# Patient Record
Sex: FEMALE | Race: WHITE | HISPANIC OR LATINO
[De-identification: names, ages, dates, MRNs, and addresses within clinical notes are randomized per-mention and may not be internally consistent; named-entity substitution may affect disease eponyms.]

---

## 2017-10-17 ENCOUNTER — RESULT REVIEW (OUTPATIENT)
Age: 62
End: 2017-10-17

## 2017-11-06 ENCOUNTER — RESULT REVIEW (OUTPATIENT)
Age: 62
End: 2017-11-06

## 2019-08-04 ENCOUNTER — EMERGENCY (EMERGENCY)
Facility: HOSPITAL | Age: 64
LOS: 1 days | Discharge: HOME | End: 2019-08-04
Attending: EMERGENCY MEDICINE
Payer: COMMERCIAL

## 2019-08-04 VITALS
TEMPERATURE: 98 F | OXYGEN SATURATION: 97 % | HEIGHT: 63 IN | RESPIRATION RATE: 18 BRPM | DIASTOLIC BLOOD PRESSURE: 74 MMHG | SYSTOLIC BLOOD PRESSURE: 114 MMHG | HEART RATE: 76 BPM | WEIGHT: 143.96 LBS

## 2019-08-04 VITALS
SYSTOLIC BLOOD PRESSURE: 103 MMHG | OXYGEN SATURATION: 97 % | DIASTOLIC BLOOD PRESSURE: 69 MMHG | RESPIRATION RATE: 20 BRPM | TEMPERATURE: 98 F | HEART RATE: 64 BPM

## 2019-08-04 PROCEDURE — 99283 EMERGENCY DEPT VISIT LOW MDM: CPT

## 2019-08-04 RX ORDER — OFLOXACIN 0.3 %
1 DROPS OPHTHALMIC (EYE) ONCE
Refills: 0 | Status: COMPLETED | OUTPATIENT
Start: 2019-08-04 | End: 2019-08-04

## 2019-08-04 RX ADMIN — Medication 1 DROP(S): at 16:18

## 2019-08-04 NOTE — ED ADULT NURSE NOTE - NSIMPLEMENTINTERV_GEN_ALL_ED
Implemented All Universal Safety Interventions:  [place] to call system. Call bell, personal items and telephone within reach. Instruct patient to call for assistance. Room bathroom lighting operational. Non-slip footwear when patient is off stretcher. Physically safe environment: no spills, clutter or unnecessary equipment. Stretcher in lowest position, wheels locked, appropriate side rails in place.

## 2019-08-04 NOTE — ED ADULT NURSE NOTE - OBJECTIVE STATEMENT
62 yo F presents to ED A+OX3, ambulatory with steady gait from waiting room c/o L eye redness since this morning. Pt. is well-appearing, sitting up in chair in no apparent distress. Breathing unlabored on RA. Skin warm pink and dry. Mild redness noted to L eye, no active drainage noted. Comfort and safety measures in place.

## 2019-08-04 NOTE — ED PROVIDER NOTE - NSFOLLOWUPCLINICS_GEN_ALL_ED_FT
[facility]  Ophthalmology  [address] [place] [address] [address]  [place] [address]  Phone: [phone]  Fax:   Follow Up Time:

## 2019-08-04 NOTE — ED PROVIDER NOTE - NSFOLLOWUPINSTRUCTIONS_ED_ALL_ED_FT
Do not rub your eye.  Take Tylenol 500mg or 650mg every 6hours for pain as needed.  Ofloxacin eye drop, 1 drop, to left eye every 6hours.  Follow up with your eye Dr. or clinic. [phone], call tomorrow for an appointment in 2days.  Return for any concerns or worsening symptoms.

## 2019-08-04 NOTE — ED PROVIDER NOTE - OBJECTIVE STATEMENT
Patient noted L eye irritation and redness this morning with some discharge from eye.  No eye pain.  Noted eyelid swelling which improved with warm compress.  Denying visual changes.

## 2019-08-04 NOTE — ED PROVIDER NOTE - PHYSICAL EXAMINATION
NAD, VSS, Afebrile, + PERRL with full EOMs. + Left injected conjunctiva with clear conjunctiva. Neuro- intact.

## 2019-11-20 ENCOUNTER — RESULT REVIEW (OUTPATIENT)
Age: 64
End: 2019-11-20

## 2020-04-25 ENCOUNTER — MESSAGE (OUTPATIENT)
Age: 65
End: 2020-04-25

## 2020-05-07 LAB
SARS-COV-2 IGG SERPL IA-ACNC: 149 INDEX
SARS-COV-2 IGG SERPL QL IA: POSITIVE

## 2020-08-19 NOTE — ED ADULT NURSE NOTE - NSFALLRSKASSESASSIST_ED_ALL_ED
[FreeTextEntry1] : The images were reviewed and discussed with the patient.  It was recommended that the MRI be repeated in February which would be one year from when it was found.  It was also discussed that it would be reasonable to have surgery to have it removed.  The patient may make an appointment to come to the office to discuss surgery for removal of the pituitary adenoma. no

## 2022-09-14 ENCOUNTER — APPOINTMENT (OUTPATIENT)
Dept: OBGYN | Facility: CLINIC | Age: 67
End: 2022-09-14

## 2022-09-14 VITALS
SYSTOLIC BLOOD PRESSURE: 116 MMHG | DIASTOLIC BLOOD PRESSURE: 76 MMHG | HEIGHT: 63 IN | WEIGHT: 158 LBS | BODY MASS INDEX: 28 KG/M2

## 2022-09-14 DIAGNOSIS — Z01.411 ENCOUNTER FOR GYNECOLOGICAL EXAMINATION (GENERAL) (ROUTINE) WITH ABNORMAL FINDINGS: ICD-10-CM

## 2022-09-14 PROCEDURE — 99386 PREV VISIT NEW AGE 40-64: CPT

## 2022-09-14 PROCEDURE — 82270 OCCULT BLOOD FECES: CPT

## 2022-09-16 NOTE — PLAN
[FreeTextEntry1] : Routine Gyn Exam:\par BSE taught\par Pap smear conducted**\par Rx given for mammogram and breast sonogram**\par Advised pt. to schedule colonoscopy**\par \par Rectal prolapse vs internal hemorrhoid - send to dr dring asap\par \par RTO in 1 year or PRN\par

## 2022-09-16 NOTE — HISTORY OF PRESENT ILLNESS
[FreeTextEntry1] : 2022. ALVINO BAUM 66 year old female  LMP PM, PMH . RETURN PT FROM 2019.\par \par She presents for annual gyn exam and offers no complaints. Denies abnormal vaginal bleeding, abnormal vaginal discharge and vaginitis symptoms. No abdominal or pelvic pain. Denies dysuria, incontinence of urine or hematuria. She reports normal BMs. Denies bloody stool or constipation/diarrhea.\par \par Reports new sx for the last year of mucous discharge from the rectum, no bleeding. Pt has a known h.o hemorrhoids but this feels different. Denies pain. reports it "comes in and out"\par \par believes she had rectal prolapse as a child during an episode of dysentery. \par

## 2022-09-16 NOTE — PHYSICAL EXAM
[Appropriately responsive] : appropriately responsive [Alert] : alert [No Acute Distress] : no acute distress [No Lymphadenopathy] : no lymphadenopathy [Regular Rate Rhythm] : regular rate rhythm [No Murmurs] : no murmurs [Clear to Auscultation B/L] : clear to auscultation bilaterally [Soft] : soft [Non-tender] : non-tender [Non-distended] : non-distended [No HSM] : No HSM [No Lesions] : no lesions [No Mass] : no mass [Oriented x3] : oriented x3 [Examination Of The Breasts] : a normal appearance [No Masses] : no breast masses were palpable [Labia Majora] : normal [Labia Minora] : normal [Normal] : normal [Uterine Adnexae] : normal [FreeTextEntry9] : ?prolapse of rectum, internal hemorrhoid

## 2022-09-19 NOTE — ED PROVIDER NOTE - EYES [+], MLM
September 20, 2022      Carmelo Leon  [address]  [place] [address]        To Whom It May Concern:    Carmelo Leon was seen in our clinic. 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        Sincerely,      Dante Ramírez MD for MD CEE Canas/stepan           REDNESS/DISCHARGE

## 2022-09-29 ENCOUNTER — APPOINTMENT (OUTPATIENT)
Dept: SURGERY | Facility: CLINIC | Age: 67
End: 2022-09-29

## 2022-09-29 VITALS
OXYGEN SATURATION: 99 % | RESPIRATION RATE: 17 BRPM | WEIGHT: 158 LBS | TEMPERATURE: 97.3 F | HEART RATE: 66 BPM | BODY MASS INDEX: 28 KG/M2 | DIASTOLIC BLOOD PRESSURE: 78 MMHG | HEIGHT: 63 IN | SYSTOLIC BLOOD PRESSURE: 123 MMHG

## 2022-09-29 DIAGNOSIS — Z78.9 OTHER SPECIFIED HEALTH STATUS: ICD-10-CM

## 2022-09-29 DIAGNOSIS — K62.3 RECTAL PROLAPSE: ICD-10-CM

## 2022-09-29 DIAGNOSIS — Z82.49 FAMILY HISTORY OF ISCHEMIC HEART DISEASE AND OTHER DISEASES OF THE CIRCULATORY SYSTEM: ICD-10-CM

## 2022-09-29 DIAGNOSIS — K64.2 THIRD DEGREE HEMORRHOIDS: ICD-10-CM

## 2022-09-29 DIAGNOSIS — Z12.11 ENCOUNTER FOR SCREENING FOR MALIGNANT NEOPLASM OF COLON: ICD-10-CM

## 2022-09-29 PROCEDURE — 46221 LIGATION OF HEMORRHOID(S): CPT

## 2022-09-29 PROCEDURE — 99204 OFFICE O/P NEW MOD 45 MIN: CPT | Mod: 25

## 2022-09-29 NOTE — ASSESSMENT
[FreeTextEntry1] : I have seen and evaluated patient and I have corroborated all nursing input into this note.  Patient has a significant birthing injury with loss of perineal body and near traumatic cloaca.  However, the patient reports normal continence of gas and stool.  The patient inquired about surgical repair.  I recommended against repair because any further surgical intervention in that area could result in incontinence.  If patient develops incontinence in the future then an overlapping sphincteroplasty with perineal reconstruction could be performed.  Currently, the patient has symptomatic prolapse of the right anterior hemorrhoid and associated rectal mucosa.  I recommended rubber band ligation.  Indications, risks, benefits, alternatives reviewed including but not limited to bleeding, infection, and failure.  Patient agreed.  The tissue was banded and the post band instruction sheet was reviewed.  Finally, it has been over 10 years since the patient's last colonoscopy and I recommended a follow-up exam.  Indications, risks, benefits, alternatives reviewed including but not limited to bleeding, perforation, and incomplete exam.  All questions were answered.  The patient plans to schedule a exam in the near future.

## 2022-09-29 NOTE — PHYSICAL EXAM
[Normal Breath Sounds] : Normal breath sounds [Normal Heart Sounds] : normal heart sounds [No Rash or Lesion] : No rash or lesion [Alert] : alert [Oriented to Person] : oriented to person [Oriented to Place] : oriented to place [Oriented to Time] : oriented to time [Calm] : calm [JVD] : no jugular venous distention  [de-identified] : WNL [de-identified] : WNL [de-identified] : ROM WNL [FreeTextEntry1] : Perianal inspection demonstrated loss of perineum from birthing injury.  Right anterior hemorrhoid and mucosal prolapse noted.  The prolapsing tissue was reducible on digital exam.  Anoscopy confirmed an enlarged right anterior hemorrhoid and inflamed right anterior mucosa.

## 2022-09-29 NOTE — REASON FOR VISIT
HPI: 67 yo female accompanied by daughter who serves as  in pt with severe MR and EF 40% in past.  Pt has previously refused surgical or percutaneous repair and does not complain of fatigue, or SOB but does have LOMELI.  Denies bipedal edema, no fevers, chills, NS.  Reports complaince of meds.      PAST MEDICAL HISTORY:  Past Medical History:   Diagnosis Date     Asthma      Cardiomyopathy, unspecified (H) / EF40% 10/5/2015     CHF (congestive heart failure) (H)      Hyperlipidemia      Kidney stone 10/2013     Lipoma 2000     Menopause      Mitral valve insufficiency 10/5/2015     Vitamin D deficiency        FAMILY HISTORY:  No family history on file.    SOCIAL HISTORY:  Social History     Socioeconomic History     Marital status:      Spouse name: None     Number of children: None     Years of education: None     Highest education level: None   Occupational History     None   Social Needs     Financial resource strain: None     Food insecurity     Worry: None     Inability: None     Transportation needs     Medical: None     Non-medical: None   Tobacco Use     Smoking status: Never Smoker     Smokeless tobacco: Never Used   Substance and Sexual Activity     Alcohol use: No     Drug use: No     Sexual activity: Yes     Partners: Male     Birth control/protection: None   Lifestyle     Physical activity     Days per week: None     Minutes per session: None     Stress: None   Relationships     Social connections     Talks on phone: None     Gets together: None     Attends Bahai service: None     Active member of club or organization: None     Attends meetings of clubs or organizations: None     Relationship status: None     Intimate partner violence     Fear of current or ex partner: None     Emotionally abused: None     Physically abused: None     Forced sexual activity: None   Other Topics Concern     Parent/sibling w/ CABG, MI or angioplasty before 65F 55M? No      Service Not Asked      "Blood Transfusions Not Asked     Caffeine Concern Yes     Occupational Exposure Not Asked     Hobby Hazards Not Asked     Sleep Concern Not Asked     Stress Concern Not Asked     Weight Concern Not Asked     Special Diet Not Asked     Back Care Not Asked     Exercise Yes     Comment: walking     Bike Helmet Not Asked     Seat Belt Not Asked     Self-Exams Not Asked   Social History Narrative     None       CURRENT MEDICATIONS:  Current Outpatient Medications   Medication Sig Dispense Refill     aspirin 81 MG tablet Take 1 tablet (81 mg) by mouth daily 100 tablet 3     Cyanocobalamin (VITAMIN B 12 PO) Take 1,000 mcg by mouth daily       furosemide (LASIX) 20 MG tablet Take 1 tablet (20 mg) by mouth daily 90 tablet 3     losartan (COZAAR) 50 MG tablet TAKE 1 TABLET BY MOUTH EVERY DAY 90 tablet 1     metoprolol succinate ER (TOPROL-XL) 50 MG 24 hr tablet Take 1 tablet (50 mg) by mouth At Bedtime 90 tablet 0     UNABLE TO FIND daily MEDICATION NAME: Vitamin D with something for joints         ROS:   Constitutional: No fever, chills, or sweats. No weight gain/loss.   ENT: No visual disturbance, ear ache, epistaxis, sore throat.   Allergies/Immunologic: Negative.   Respiratory: No cough, hemoptysis.   Cardiovascular: As per HPI.   GI: No nausea, vomiting, hematemesis, melena, or hematochezia.   : No urinary frequency, dysuria, or hematuria.   Integument: Negative.   Psychiatric: Negative.   Neuro: Negative.   Endocrinology: Negative.   Musculoskeletal: Negative.    EXAM:  BP (!) 151/84 (BP Location: Right arm, Patient Position: Sitting, Cuff Size: Adult Regular)   Pulse 59   Ht 1.575 m (5' 2\")   Wt 72.1 kg (159 lb)   LMP  (LMP Unknown)   SpO2 99%   BMI 29.08 kg/m    General: appears comfortable, alert and articulate  Head: normocephalic, atraumatic  Eyes: anicteric sclera, EOMI  No pedal edema    Labs:  CBC RESULTS:  Lab Results   Component Value Date    WBC 6.1 02/11/2019    RBC 4.15 02/11/2019    HGB 12.0 " 02/11/2019    HCT 37.0 02/11/2019    MCV 89 02/11/2019    MCH 28.9 02/11/2019    MCHC 32.4 02/11/2019    RDW 13.0 02/11/2019     02/11/2019       CMP RESULTS:  Lab Results   Component Value Date     06/09/2020    POTASSIUM 4.2 06/09/2020    CHLORIDE 109 06/09/2020    CO2 25 06/09/2020    ANIONGAP 6 06/09/2020    GLC 83 06/09/2020    BUN 16 06/09/2020    CR 0.72 06/09/2020    GFRESTIMATED 89 06/09/2020    GFRESTBLACK >90 06/09/2020    MIKE 8.5 06/09/2020    BILITOTAL 0.4 06/09/2020    ALBUMIN 3.5 06/09/2020    ALKPHOS 56 06/09/2020    ALT 25 06/09/2020    AST 14 06/09/2020        INR RESULTS:  No results found for: INR    No results found for: MAG  Lab Results   Component Value Date    NTBNPI 113 05/18/2018     Lab Results   Component Value Date    NTBNP 254 (H) 03/09/2016       Assessment and Plan:   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  Now agreeable to repair of valvular dz.  Will need TTE, increase toprol to 100 mg and follow BP.  Will refer to [facility] for CT and assessment of whether pt will benefit from closure.      Flash Mirza MD, PhD  Professor, Heart Failure and Cardiac Transplantation  [facility]  CC  Patient Care Team:  Aileen Pizano APRN BETSY as PCP - General (Nurse Practitioner)  Flash Mirza MD as MD (Cardiology)  Sherrill George MD as Assigned PCP  SHERRILL GEORGE    Answers for HPI/ROS submitted by the patient on 8/24/2020   General Symptoms: Yes  Skin Symptoms: No  HENT Symptoms: No  EYE SYMPTOMS: No  HEART SYMPTOMS: No  LUNG SYMPTOMS: Yes  INTESTINAL SYMPTOMS: No  URINARY SYMPTOMS: No  GYNECOLOGIC SYMPTOMS: No  BREAST SYMPTOMS: No  SKELETAL SYMPTOMS: Yes  BLOOD SYMPTOMS: No  NERVOUS SYSTEM SYMPTOMS: No  MENTAL HEALTH SYMPTOMS: No  Fever: No  Loss of appetite: No  Weight loss: No  Weight gain: No  Fatigue: No  Night sweats: No  Chills: No  Increased stress: No  Excessive hunger: No  Excessive  thirst: No  Feeling hot or cold when others believe the temperature is normal: No  Loss of height: No  Post-operative complications: No  Surgical site pain: No  Hallucinations: No  Change in or Loss of Energy: No  Hyperactivity: No  Confusion: No  Cough: No  Sputum or phlegm: No  Coughing up blood: No  Difficulty breating or shortness of breath: No  Snoring: No  Wheezing: No  Difficulty breathing on exertion: Yes  Nighttime Cough: No  Difficulty breathing when lying flat: No  Back pain: No  Muscle aches: Yes  Neck pain: No  Swollen joints: Yes  Joint pain: Yes  Bone pain: Yes  Muscle cramps: No  Muscle weakness: No  Joint stiffness: Yes  Bone fracture: No     [FreeTextEntry1] : referred by Dr. Nupur Washington

## 2022-09-29 NOTE — CONSULT LETTER
[Dear  ___] : Dear  [unfilled], [Courtesy Letter:] : I had the pleasure of seeing your patient, [unfilled], in my office today. [Please see my note below.] : Please see my note below. [Consult Closing:] : Thank you very much for allowing me to participate in the care of this patient.  If you have any questions, please do not hesitate to contact me. [Sincerely,] : Sincerely, [DrSmiley  ___] : Dr. PACHECO [FreeTextEntry2] : Dr. Nupur Washington [FreeTextEntry3] : Mendoza Theodore M.D., MARLON.ZION., F.ABBE.S.SOPHIARSmileyS.\[facility] Chief Colorectal Clinical Services, [facility]

## 2022-09-29 NOTE — HISTORY OF PRESENT ILLNESS
[FreeTextEntry1] : Denise is a 65 y/o female here for a consultation visit, possible rectal prolapse. \par \par Colonoscopy 15 years ago with Dr. Kody Parks, pt does not have report, reports internal hemorrhoids, 1 benign polyp. \par \par Today pt reports feeling well, no pain. 2 formed BMs daily, no pain, no bleeding. Pt reports prolapsing tissue the size of a grape for several years. Pt reports pushing tissue back in and it pops back out. Pt reports itching, daily leakage of mucus. No episodes of incontinence of stool or gas. Good appetite, no nausea, no vomiting. denies fever and chills. Not on a anticoagulants. Hx of 2 vaginal delivers, tearing.

## 2022-10-10 LAB
CYTOLOGY CVX/VAG DOC THIN PREP: NORMAL
HPV HIGH+LOW RISK DNA PNL CVX: NOT DETECTED

## 2022-11-03 RX ORDER — SODIUM PICOSULFATE, MAGNESIUM OXIDE, AND ANHYDROUS CITRIC ACID 10; 3.5; 12 MG/160ML; G/160ML; G/160ML
10-3.5-12 MG-GM LIQUID ORAL
Qty: 1 | Refills: 0 | Status: ACTIVE | COMMUNITY
Start: 2022-11-03 | End: 1900-01-01

## 2022-11-13 LAB — SARS-COV-2 N GENE NPH QL NAA+PROBE: NOT DETECTED

## 2022-11-15 ENCOUNTER — APPOINTMENT (OUTPATIENT)
Dept: SURGERY | Facility: CLINIC | Age: 67
End: 2022-11-15

## 2022-11-15 PROCEDURE — 45378 DIAGNOSTIC COLONOSCOPY: CPT

## 2023-10-18 ENCOUNTER — APPOINTMENT (OUTPATIENT)
Dept: OTOLARYNGOLOGY | Facility: CLINIC | Age: 68
End: 2023-10-18
Payer: COMMERCIAL

## 2023-10-18 ENCOUNTER — NON-APPOINTMENT (OUTPATIENT)
Age: 68
End: 2023-10-18

## 2023-10-18 VITALS
DIASTOLIC BLOOD PRESSURE: 75 MMHG | SYSTOLIC BLOOD PRESSURE: 112 MMHG | HEIGHT: 63 IN | BODY MASS INDEX: 28 KG/M2 | WEIGHT: 158 LBS | HEART RATE: 60 BPM

## 2023-10-18 DIAGNOSIS — H90.3 SENSORINEURAL HEARING LOSS, BILATERAL: ICD-10-CM

## 2023-10-18 DIAGNOSIS — R42 DIZZINESS AND GIDDINESS: ICD-10-CM

## 2023-10-18 PROCEDURE — 99203 OFFICE O/P NEW LOW 30 MIN: CPT

## 2023-10-18 PROCEDURE — 92550 TYMPANOMETRY & REFLEX THRESH: CPT

## 2023-10-18 PROCEDURE — 92557 COMPREHENSIVE HEARING TEST: CPT

## 2023-11-02 ENCOUNTER — APPOINTMENT (OUTPATIENT)
Dept: OBGYN | Facility: CLINIC | Age: 68
End: 2023-11-02
Payer: COMMERCIAL

## 2023-11-02 VITALS
HEIGHT: 63 IN | WEIGHT: 160 LBS | SYSTOLIC BLOOD PRESSURE: 116 MMHG | BODY MASS INDEX: 28.35 KG/M2 | DIASTOLIC BLOOD PRESSURE: 77 MMHG

## 2023-11-02 DIAGNOSIS — Z01.419 ENCOUNTER FOR GYNECOLOGICAL EXAMINATION (GENERAL) (ROUTINE) W/OUT ABNORMAL FINDINGS: ICD-10-CM

## 2023-11-02 PROCEDURE — 99397 PER PM REEVAL EST PAT 65+ YR: CPT

## 2023-11-06 LAB
CYTOLOGY CVX/VAG DOC THIN PREP: NORMAL
HPV HIGH+LOW RISK DNA PNL CVX: NOT DETECTED

## 2023-11-08 ENCOUNTER — APPOINTMENT (OUTPATIENT)
Dept: OBGYN | Facility: CLINIC | Age: 68
End: 2023-11-08
Payer: COMMERCIAL

## 2023-11-08 PROCEDURE — 77080 DXA BONE DENSITY AXIAL: CPT

## 2023-11-15 ENCOUNTER — APPOINTMENT (OUTPATIENT)
Dept: MRI IMAGING | Facility: CLINIC | Age: 68
End: 2023-11-15

## 2023-11-15 ENCOUNTER — NON-APPOINTMENT (OUTPATIENT)
Age: 68
End: 2023-11-15

## 2023-11-15 ENCOUNTER — OUTPATIENT (OUTPATIENT)
Dept: OUTPATIENT SERVICES | Facility: HOSPITAL | Age: 68
LOS: 1 days | End: 2023-11-15
Payer: COMMERCIAL

## 2023-11-15 DIAGNOSIS — H90.3 SENSORINEURAL HEARING LOSS, BILATERAL: ICD-10-CM

## 2023-11-15 PROCEDURE — A9585: CPT

## 2023-11-15 PROCEDURE — 70553 MRI BRAIN STEM W/O & W/DYE: CPT | Mod: 26

## 2023-11-15 PROCEDURE — 70553 MRI BRAIN STEM W/O & W/DYE: CPT

## 2024-01-16 ENCOUNTER — EMERGENCY (EMERGENCY)
Facility: HOSPITAL | Age: 69
LOS: 1 days | Discharge: HOME | End: 2024-01-16
Attending: EMERGENCY MEDICINE
Payer: COMMERCIAL

## 2024-01-16 VITALS
DIASTOLIC BLOOD PRESSURE: 71 MMHG | TEMPERATURE: 98 F | RESPIRATION RATE: 17 BRPM | SYSTOLIC BLOOD PRESSURE: 109 MMHG | HEART RATE: 65 BPM | OXYGEN SATURATION: 98 %

## 2024-01-16 VITALS
HEIGHT: 63 IN | WEIGHT: 158.07 LBS | TEMPERATURE: 98 F | HEART RATE: 82 BPM | RESPIRATION RATE: 18 BRPM | DIASTOLIC BLOOD PRESSURE: 98 MMHG | OXYGEN SATURATION: 96 % | SYSTOLIC BLOOD PRESSURE: 134 MMHG

## 2024-01-16 LAB
ALBUMIN SERPL ELPH-MCNC: 4.4 G/DL — SIGNIFICANT CHANGE UP (ref 3.3–5)
ALP SERPL-CCNC: 125 U/L — HIGH (ref 40–120)
ALT FLD-CCNC: 16 U/L — SIGNIFICANT CHANGE UP (ref 10–45)
ANION GAP SERPL CALC-SCNC: 11 MMOL/L — SIGNIFICANT CHANGE UP (ref 5–17)
APTT BLD: 29.8 SEC — SIGNIFICANT CHANGE UP (ref 24.5–35.6)
AST SERPL-CCNC: 20 U/L — SIGNIFICANT CHANGE UP (ref 10–40)
BASE EXCESS BLDV CALC-SCNC: -0.2 MMOL/L — SIGNIFICANT CHANGE UP (ref -2–3)
BASOPHILS # BLD AUTO: 0.05 K/UL — SIGNIFICANT CHANGE UP (ref 0–0.2)
BASOPHILS NFR BLD AUTO: 1.2 % — SIGNIFICANT CHANGE UP (ref 0–2)
BILIRUB SERPL-MCNC: 0.2 MG/DL — SIGNIFICANT CHANGE UP (ref 0.2–1.2)
BLD GP AB SCN SERPL QL: NEGATIVE — SIGNIFICANT CHANGE UP
BUN SERPL-MCNC: 18 MG/DL — SIGNIFICANT CHANGE UP (ref 7–23)
CA-I SERPL-SCNC: 1.25 MMOL/L — SIGNIFICANT CHANGE UP (ref 1.15–1.33)
CALCIUM SERPL-MCNC: 9.6 MG/DL — SIGNIFICANT CHANGE UP (ref 8.4–10.5)
CHLORIDE BLDV-SCNC: 103 MMOL/L — SIGNIFICANT CHANGE UP (ref 96–108)
CHLORIDE SERPL-SCNC: 102 MMOL/L — SIGNIFICANT CHANGE UP (ref 96–108)
CO2 BLDV-SCNC: 27 MMOL/L — HIGH (ref 22–26)
CO2 SERPL-SCNC: 25 MMOL/L — SIGNIFICANT CHANGE UP (ref 22–31)
CREAT SERPL-MCNC: 0.91 MG/DL — SIGNIFICANT CHANGE UP (ref 0.5–1.3)
EGFR: 70 ML/MIN/1.73M2 — SIGNIFICANT CHANGE UP
EOSINOPHIL # BLD AUTO: 0.29 K/UL — SIGNIFICANT CHANGE UP (ref 0–0.5)
EOSINOPHIL NFR BLD AUTO: 7.1 % — HIGH (ref 0–6)
GAS PNL BLDV: 135 MMOL/L — LOW (ref 136–145)
GAS PNL BLDV: SIGNIFICANT CHANGE UP
GAS PNL BLDV: SIGNIFICANT CHANGE UP
GLUCOSE BLDV-MCNC: 110 MG/DL — HIGH (ref 70–99)
GLUCOSE SERPL-MCNC: 115 MG/DL — HIGH (ref 70–99)
HCO3 BLDV-SCNC: 26 MMOL/L — SIGNIFICANT CHANGE UP (ref 22–29)
HCT VFR BLD CALC: 38.7 % — SIGNIFICANT CHANGE UP (ref 34.5–45)
HCT VFR BLDA CALC: 38 % — SIGNIFICANT CHANGE UP (ref 34.5–46.5)
HGB BLD CALC-MCNC: 12.8 G/DL — SIGNIFICANT CHANGE UP (ref 11.7–16.1)
HGB BLD-MCNC: 12.6 G/DL — SIGNIFICANT CHANGE UP (ref 11.5–15.5)
IMM GRANULOCYTES NFR BLD AUTO: 0.2 % — SIGNIFICANT CHANGE UP (ref 0–0.9)
INR BLD: 1.15 RATIO — SIGNIFICANT CHANGE UP (ref 0.85–1.18)
LACTATE BLDV-MCNC: 1.2 MMOL/L — SIGNIFICANT CHANGE UP (ref 0.5–2)
LYMPHOCYTES # BLD AUTO: 1.19 K/UL — SIGNIFICANT CHANGE UP (ref 1–3.3)
LYMPHOCYTES # BLD AUTO: 29 % — SIGNIFICANT CHANGE UP (ref 13–44)
MCHC RBC-ENTMCNC: 29.5 PG — SIGNIFICANT CHANGE UP (ref 27–34)
MCHC RBC-ENTMCNC: 32.6 GM/DL — SIGNIFICANT CHANGE UP (ref 32–36)
MCV RBC AUTO: 90.6 FL — SIGNIFICANT CHANGE UP (ref 80–100)
MONOCYTES # BLD AUTO: 0.4 K/UL — SIGNIFICANT CHANGE UP (ref 0–0.9)
MONOCYTES NFR BLD AUTO: 9.8 % — SIGNIFICANT CHANGE UP (ref 2–14)
NEUTROPHILS # BLD AUTO: 2.16 K/UL — SIGNIFICANT CHANGE UP (ref 1.8–7.4)
NEUTROPHILS NFR BLD AUTO: 52.7 % — SIGNIFICANT CHANGE UP (ref 43–77)
NRBC # BLD: 0 /100 WBCS — SIGNIFICANT CHANGE UP (ref 0–0)
PCO2 BLDV: 47 MMHG — HIGH (ref 39–42)
PH BLDV: 7.35 — SIGNIFICANT CHANGE UP (ref 7.32–7.43)
PLATELET # BLD AUTO: 209 K/UL — SIGNIFICANT CHANGE UP (ref 150–400)
PO2 BLDV: 33 MMHG — SIGNIFICANT CHANGE UP (ref 25–45)
POTASSIUM BLDV-SCNC: 4.1 MMOL/L — SIGNIFICANT CHANGE UP (ref 3.5–5.1)
POTASSIUM SERPL-MCNC: 4 MMOL/L — SIGNIFICANT CHANGE UP (ref 3.5–5.3)
POTASSIUM SERPL-SCNC: 4 MMOL/L — SIGNIFICANT CHANGE UP (ref 3.5–5.3)
PROT SERPL-MCNC: 7.7 G/DL — SIGNIFICANT CHANGE UP (ref 6–8.3)
PROTHROM AB SERPL-ACNC: 12 SEC — SIGNIFICANT CHANGE UP (ref 9.5–13)
RBC # BLD: 4.27 M/UL — SIGNIFICANT CHANGE UP (ref 3.8–5.2)
RBC # FLD: 13.9 % — SIGNIFICANT CHANGE UP (ref 10.3–14.5)
RH IG SCN BLD-IMP: POSITIVE — SIGNIFICANT CHANGE UP
SAO2 % BLDV: 53.3 % — LOW (ref 67–88)
SODIUM SERPL-SCNC: 138 MMOL/L — SIGNIFICANT CHANGE UP (ref 135–145)
TROPONIN T, HIGH SENSITIVITY RESULT: <6 NG/L — SIGNIFICANT CHANGE UP (ref 0–51)
WBC # BLD: 4.1 K/UL — SIGNIFICANT CHANGE UP (ref 3.8–10.5)
WBC # FLD AUTO: 4.1 K/UL — SIGNIFICANT CHANGE UP (ref 3.8–10.5)

## 2024-01-16 PROCEDURE — 85025 COMPLETE CBC W/AUTO DIFF WBC: CPT

## 2024-01-16 PROCEDURE — 83605 ASSAY OF LACTIC ACID: CPT

## 2024-01-16 PROCEDURE — 86900 BLOOD TYPING SEROLOGIC ABO: CPT

## 2024-01-16 PROCEDURE — 93005 ELECTROCARDIOGRAM TRACING: CPT

## 2024-01-16 PROCEDURE — 82947 ASSAY GLUCOSE BLOOD QUANT: CPT

## 2024-01-16 PROCEDURE — 85018 HEMOGLOBIN: CPT

## 2024-01-16 PROCEDURE — 85730 THROMBOPLASTIN TIME PARTIAL: CPT

## 2024-01-16 PROCEDURE — 84484 ASSAY OF TROPONIN QUANT: CPT

## 2024-01-16 PROCEDURE — 96374 THER/PROPH/DIAG INJ IV PUSH: CPT | Mod: XU

## 2024-01-16 PROCEDURE — 99285 EMERGENCY DEPT VISIT HI MDM: CPT

## 2024-01-16 PROCEDURE — 70496 CT ANGIOGRAPHY HEAD: CPT | Mod: MA

## 2024-01-16 PROCEDURE — 85014 HEMATOCRIT: CPT

## 2024-01-16 PROCEDURE — 36415 COLL VENOUS BLD VENIPUNCTURE: CPT

## 2024-01-16 PROCEDURE — 70498 CT ANGIOGRAPHY NECK: CPT | Mod: MA

## 2024-01-16 PROCEDURE — 86901 BLOOD TYPING SEROLOGIC RH(D): CPT

## 2024-01-16 PROCEDURE — 82803 BLOOD GASES ANY COMBINATION: CPT

## 2024-01-16 PROCEDURE — 85610 PROTHROMBIN TIME: CPT

## 2024-01-16 PROCEDURE — 82330 ASSAY OF CALCIUM: CPT

## 2024-01-16 PROCEDURE — 82435 ASSAY OF BLOOD CHLORIDE: CPT

## 2024-01-16 PROCEDURE — 99285 EMERGENCY DEPT VISIT HI MDM: CPT | Mod: 25

## 2024-01-16 PROCEDURE — 86850 RBC ANTIBODY SCREEN: CPT

## 2024-01-16 PROCEDURE — 80053 COMPREHEN METABOLIC PANEL: CPT

## 2024-01-16 PROCEDURE — 70496 CT ANGIOGRAPHY HEAD: CPT | Mod: 26,MA

## 2024-01-16 PROCEDURE — 70450 CT HEAD/BRAIN W/O DYE: CPT | Mod: 26,MA,59

## 2024-01-16 PROCEDURE — 84295 ASSAY OF SERUM SODIUM: CPT

## 2024-01-16 PROCEDURE — 70450 CT HEAD/BRAIN W/O DYE: CPT | Mod: MA

## 2024-01-16 PROCEDURE — 84132 ASSAY OF SERUM POTASSIUM: CPT

## 2024-01-16 PROCEDURE — 70498 CT ANGIOGRAPHY NECK: CPT | Mod: 26,MA

## 2024-01-16 PROCEDURE — 82962 GLUCOSE BLOOD TEST: CPT

## 2024-01-16 RX ORDER — MECLIZINE HCL 12.5 MG
25 TABLET ORAL ONCE
Refills: 0 | Status: COMPLETED | OUTPATIENT
Start: 2024-01-16 | End: 2024-01-16

## 2024-01-16 RX ORDER — ONDANSETRON 8 MG/1
1 TABLET, FILM COATED ORAL
Qty: 15 | Refills: 0
Start: 2024-01-16 | End: 2024-01-20

## 2024-01-16 RX ORDER — MECLIZINE HCL 12.5 MG
1 TABLET ORAL
Qty: 10 | Refills: 0
Start: 2024-01-16 | End: 2024-01-20

## 2024-01-16 RX ORDER — SODIUM CHLORIDE 9 MG/ML
1000 INJECTION INTRAMUSCULAR; INTRAVENOUS; SUBCUTANEOUS ONCE
Refills: 0 | Status: COMPLETED | OUTPATIENT
Start: 2024-01-16 | End: 2024-01-16

## 2024-01-16 RX ORDER — METOCLOPRAMIDE HCL 10 MG
10 TABLET ORAL ONCE
Refills: 0 | Status: COMPLETED | OUTPATIENT
Start: 2024-01-16 | End: 2024-01-16

## 2024-01-16 RX ADMIN — Medication 25 MILLIGRAM(S): at 18:12

## 2024-01-16 RX ADMIN — SODIUM CHLORIDE 1000 MILLILITER(S): 9 INJECTION INTRAMUSCULAR; INTRAVENOUS; SUBCUTANEOUS at 18:17

## 2024-01-16 RX ADMIN — Medication 10 MILLIGRAM(S): at 18:10

## 2024-01-16 NOTE — ED PROVIDER NOTE - PHYSICAL EXAMINATION
GENERAL: no acute distress, non-toxic appearing  HEAD: normocephalic, atraumatic  HEENT: PERRLA, EOMI, normal conjunctiva, oral mucosa moist, CN intact, +horizontal nystagus  CARDIAC: regular rate and rhythm, no appreciable murmurs  PULM: clear to ascultation bilaterally  GI: abdomen nondistended, soft, nontender  NEURO: alert and oriented x 3, normal speech, no focal motor or sensory deficits, gait normal, no gross neurologic deficit  MSK: no visible deformities, no peripheral edema, calf tenderness/redness/swelling  SKIN: no visible rashes, dry, well-perfused  PSYCH: appropriate mood and affect

## 2024-01-16 NOTE — ED ADULT NURSE NOTE - NSFALLUNIVINTERV_ED_ALL_ED
Bed/Stretcher in lowest position, wheels locked, appropriate side rails in place/Call bell, personal items and telephone in reach/Instruct patient to call for assistance before getting out of bed/chair/stretcher/Non-slip footwear applied when patient is off stretcher/[place] to call system/Physically safe environment - no spills, clutter or unnecessary equipment/Purposeful proactive rounding/Room/bathroom lighting operational, light cord in reach

## 2024-01-16 NOTE — CONSULT NOTE ADULT - SUBJECTIVE AND OBJECTIVE BOX
Neurology - Consult Note    -  Spectra: [phone] ([facility]), [phone] ([facility])  -    HPI: Patient ALVINO BAUM is a 68y (1955) woman with a PMHx significant for vertigo, who presents w/ CC of vertigo. Last year, she had 3x episodes of room-spinning sensation that lasted for a few days each. They were debilitating and caused her to miss out from work. Today, she was at work and had acute onset tinnitus, vertigo, nausea, and vomiting. Denies unilateral numbness, weakness, speech difficulties, blurry vision, or difficulty walking.       Review of Systems:  INCOMPLETE   CONSTITUTIONAL: No fevers or chills  EYES AND ENT: No visual changes or no throat pain   NECK: No pain or stiffness  RESPIRATORY: No hemoptysis or shortness of breath  CARDIOVASCULAR: No chest pain or palpitations  GASTROINTESTINAL: No melena or hematochezia  GENITOURINARY: No dysuria or hematuria  NEUROLOGICAL: +As stated in HPI above  SKIN: No itching, burning, rashes, or lesions   All other review of systems is negative unless indicated above.    Allergies:  penicillin (Rash)      PMHx/PSHx/Family Hx: As above, otherwise see below   No pertinent past medical history        Social Hx:  No current use of tobacco, alcohol, or illicit drugs  Lives with ***    Medications:  MEDICATIONS  (STANDING):    MEDICATIONS  (PRN):      Vitals:  T(C): 36.7 (01-16-24 @ 16:58), Max: 36.7 (01-16-24 @ 16:58)  HR: 82 (01-16-24 @ 16:58) (82 - 82)  BP: 134/98 (01-16-24 @ 16:58) (134/98 - 134/98)  RR: 18 (01-16-24 @ 16:58) (18 - 18)  SpO2: 96% (01-16-24 @ 16:58) (96% - 96%)    Physical Examination: INCOMPLETE  General - NAD  Cardiovascular - Peripheral pulses palpable, no edema  Eyes - Fundoscopy with flat, sharp optic discs and no hemorrhage or exudates; Fundoscopy not well visualized; Fundoscopy not performed due to safety precautions in the setting of the COVID-19 pandemic    Neurologic Exam:  Mental status - Awake, Alert, Oriented to person, place, and time. Speech fluent, repetition and naming intact. Follows simple and complex commands. Attention/concentration, recent and remote memory (including registration and recall), and fund of knowledge intact    Cranial nerves - PERRLA, VFF, EOMI, face sensation (V1-V3) intact b/l, facial strength intact without asymmetry b/l, hearing intact b/l, palate with symmetric elevation, trapezius OR sternocleidomastiod 5/5 strength b/l, tongue midline on protrusion with full lateral movement    Motor - Normal bulk and tone throughout. No pronator drift.  Strength testing            Deltoid      Biceps      Triceps     Wrist Extension    Wrist Flexion     Interossei         R            5                 5               5                     5                              5                        5                 5  L             5                 5               5                     5                              5                        5                 5              Hip Flexion    Hip Extension    Knee Flexion    Knee Extension    Dorsiflexion    Plantar Flexion  R              5                           5                       5                           5                            5                          5  L              5                           5                        5                           5                            5                          5    Sensation - Light touch/temperature OR pain/vibration intact throughout    DTR's -             Biceps      Triceps     Brachioradialis      Patellar    Ankle    Toes/plantar response  R             2+             2+                  2+                       2+            2+                 Down  L              2+             2+                 2+                        2+           2+                 Down    Coordination - Finger to Nose intact b/l. No tremors appreciated    Gait and station - Normal casual gait. Romberg (-)    Labs:                        12.6   4.10  )-----------( 209      ( 16 Jan 2024 17:30 )             38.7     01-16    138  |  102  |  18  ----------------------------<  115<H>  4.0   |  25  |  0.91    Ca    9.6      16 Jan 2024 17:30    TPro  7.7  /  Alb  4.4  /  TBili  0.2  /  DBili  x   /  AST  20  /  ALT  16  /  AlkPhos  125<H>  01-16    CAPILLARY BLOOD GLUCOSE  113 (16 Jan 2024 19:09)      POCT Blood Glucose.: 113 mg/dL (16 Jan 2024 17:00)    LIVER FUNCTIONS - ( 16 Jan 2024 17:30 )  Alb: 4.4 g/dL / Pro: 7.7 g/dL / ALK PHOS: 125 U/L / ALT: 16 U/L / AST: 20 U/L / GGT: x             PT/INR - ( 16 Jan 2024 17:30 )   PT: 12.0 sec;   INR: 1.15 ratio         PTT - ( 16 Jan 2024 17:30 )  PTT:29.8 sec  CSF:                  Radiology:     Neurology - Consult Note    -  Spectra: [phone] ([facility]), [phone] ([facility])  -    HPI: Patient ALVINO BAUM is a 68y (1955) woman with a PMHx significant for vertigo, who presents w/ CC of vertigo. Last year, she had 3x episodes of room-spinning sensation that lasted for a few days each. They were debilitating and caused her to miss out from work. Today, she was at work and had acute onset tinnitus, vertigo, nausea, and vomiting. Denies unilateral numbness, weakness, speech difficulties, blurry vision, or difficulty walking. States in the past has seen ENT physician and was prescribed meclizine.      Review of Systems:   CONSTITUTIONAL: No fevers or chills  EYES AND ENT: No visual changes or no throat pain; +tinnitus  NECK: No pain or stiffness  RESPIRATORY: No hemoptysis or shortness of breath  CARDIOVASCULAR: No chest pain or palpitations  GASTROINTESTINAL: No melena or hematochezia; +nausea/vomit  GENITOURINARY: No dysuria or hematuria  NEUROLOGICAL: +As stated in HPI above  SKIN: No itching, burning, rashes, or lesions   All other review of systems is negative unless indicated above.    Allergies:  penicillin (Rash)      PMHx/PSHx/Family Hx: As above, otherwise see below   No pertinent past medical history        Social Hx:  Works as PCA    Medications:  MEDICATIONS  (STANDING):    MEDICATIONS  (PRN):      Vitals:  T(C): 36.7 (01-16-24 @ 16:58), Max: 36.7 (01-16-24 @ 16:58)  HR: 82 (01-16-24 @ 16:58) (82 - 82)  BP: 134/98 (01-16-24 @ 16:58) (134/98 - 134/98)  RR: 18 (01-16-24 @ 16:58) (18 - 18)  SpO2: 96% (01-16-24 @ 16:58) (96% - 96%)    Physical Examination:   General - NAD, uncomfortable female  Cardiovascular - Peripheral pulses palpable, no edema  Eyes - Fundoscopy not performed due to safety precautions in the setting of the COVID-19 pandemic    Neurologic Exam:  Mental status - Awake, Alert, Oriented to person, place, and time. Speech fluent. Follows simple and complex commands. Attention/concentration, recent and remote memory (including registration and recall), and fund of knowledge intact    Cranial nerves - PERRLA, VFF, EOMI, L-beating nystagmus w/ L gaze, face sensation (V1-V3) intact b/l, facial strength intact without asymmetry b/l, hearing intact b/l, palate with symmetric elevation, trapezius 5/5 strength b/l, tongue midline on protrusion    Motor - Normal bulk and tone throughout. No pronator drift. 5/5 strength in b/l deltoids, triceps, hand , hip flexors, ankle dorsi/plantarflexion    Sensation - Light touch intact throughout    DTR's -             Biceps      Triceps     Brachioradialis      Patellar    Ankle    Toes/plantar response  R             2+             2+                  2+                       2+            2+                 Down  L              2+             2+                 2+                        2+           2+                 Down    Coordination - Finger to Nose intact b/l. No tremors appreciated    Gait and station - Normal casual gait. Romberg (-)    HINTS - corrective saccade present w/ head impulse to L    Labs:                        12.6   4.10  )-----------( 209      ( 16 Jan 2024 17:30 )             38.7     01-16    138  |  102  |  18  ----------------------------<  115<H>  4.0   |  25  |  0.91    Ca    9.6      16 Jan 2024 17:30    TPro  7.7  /  Alb  4.4  /  TBili  0.2  /  DBili  x   /  AST  20  /  ALT  16  /  AlkPhos  125<H>  01-16    CAPILLARY BLOOD GLUCOSE  113 (16 Jan 2024 19:09)      POCT Blood Glucose.: 113 mg/dL (16 Jan 2024 17:00)    LIVER FUNCTIONS - ( 16 Jan 2024 17:30 )  Alb: 4.4 g/dL / Pro: 7.7 g/dL / ALK PHOS: 125 U/L / ALT: 16 U/L / AST: 20 U/L / GGT: x             PT/INR - ( 16 Jan 2024 17:30 )   PT: 12.0 sec;   INR: 1.15 ratio         PTT - ( 16 Jan 2024 17:30 )  PTT:29.8 sec                    Radiology:  CT BRAIN STROKE PROTOCOL:  IMPRESSION:  No acute hemorrhage, mass effect, or midline shift.

## 2024-01-16 NOTE — ED PROVIDER NOTE - PATIENT PORTAL LINK FT
You can access the FollowMyHealth Patient Portal offered by [facility] by registering at the following website: [email or web address]. By joining [facility]’s FollowMyHealth portal, you will also be able to view your health information using other applications (apps) compatible with our system.

## 2024-01-16 NOTE — ED PROVIDER NOTE - OBJECTIVE STATEMENT
Patient is a 68-year-old history of vertigo, presenting with dizziness.  Patient states she gets episodes of vertigo at home, takes meclizine, however often cannot keep it down.  Patient states her vertigo symptoms last about 3 days typically.  Patient states nausea comes in waves, dizziness worse with movement, feels like she is on her tetanic.  Denies numbness, weakness to 1 side.  Denies difficulty speaking, blurry vision, double vision, fevers.

## 2024-01-16 NOTE — ED PROVIDER NOTE - PROGRESS NOTE DETAILS
Danielle Jasso MD PGY3: CTH normal. Danielle Jasso MD PGY3: Symptoms have resolved. CTA negative. Patient to be discharged, will fu with her ENT and with neurology. Will sent rx for meclizine and zofran.

## 2024-01-16 NOTE — ED PROVIDER NOTE - CLINICAL SUMMARY MEDICAL DECISION MAKING FREE TEXT BOX
Patient is a 68-year-old history of vertigo, presenting with dizziness.  Patient most likely with peripheral vertigo.  On neuro exam, patient with corrective saccades, horizontal nystagmus.  Dizziness worse with position.  Patient has history of vertigo, tinnitus.  Patient has seen in ENT for this in the past.  Has meclizine prescribed at home.  Most likely peripheral vertigo. Will give fluids, Reglan, meclizine, and reassess.  Will get non-con head CT to r/o large bleed/mass. Patient is a 68-year-old history of vertigo, presenting with dizziness.  Patient most likely with peripheral vertigo.  On neuro exam, patient with corrective saccades, horizontal nystagmus.  Dizziness worse with position.  Patient has history of vertigo, tinnitus.  Patient has seen in ENT for this in the past.  Has meclizine prescribed at home.  Most likely peripheral vertigo. Will give fluids, Reglan, meclizine, and reassess.  Will get non-con head CT to r/o large bleed/mass.    karlene: 68 year old female with pmxh of vertigo here for dizziness. code stroke at triage. on exam, horizontal nystagmus.  meclizine prescribed at home.  PE: att exam: patient awake alert NAD, eomi, peerla, horizontal nystagmus. LUNGS CTAB no wheeze no crackle. CARD RRR no m/r/g.  Abdomen soft NT ND no rebound no guarding no CVA tenderness. EXT WWP no edema no calf tenderness CV 2+DP/PT bilaterally. neuro A&Ox3, no focal deficits, cn 2-12 intact.  skin warm and dry no rash  plan: imaging, symptomatic treatment, neuro following, reassess

## 2024-01-16 NOTE — ED PROVIDER NOTE - CARE PROVIDER_API CALL
Dimitry Smith  Neurology  [address], [address]  [place] [address]  Phone: [phone]  Fax: [phone]  Follow Up Time:

## 2024-01-16 NOTE — CONSULT NOTE ADULT - ASSESSMENT
68y F with Hx vertigo, presenting w/ CC of vertigo    LKW: 4pm 1/16/24  NIHSS: 0  MRS: 0  Not tenecteplase candidate due to peripheral vertigo more likely  Not thrombectomy candidate due to lack of suspicion for LVO    Impression: acute onset of vertigo, nausea, vomiting, tinnitus. Most suspicious for peripheral vertigo. Would treat symptoms accordingly    Recommendations:  [] Zofran 4mg q6h PRN for nausea/vomit  [] meclizine 25mg q6h PRN for dizziness  [] PT evaluation (vestibular therapy)  [] follow-up with established ENT provider  [] follow-up with neurology (Dr Smith [phone]) in 2 weeks    Case discussed w/ stroke fellow Dr. Kevyn Renae under supervision of attending Dr. Maria Victoria Carerra 68y F with Hx vertigo, presenting w/ CC of vertigo    LKW: 4pm 1/16/24  NIHSS: 0  MRS: 0  Not tenecteplase candidate due to peripheral vertigo more likely  Not thrombectomy candidate due to lack of suspicion for LVO    Impression: acute onset of vertigo, nausea, vomiting, tinnitus. Most suspicious for peripheral vertigo. Would treat symptoms accordingly    Recommendations:  [] f/u CTA head/neck w/ contrast  [] Zofran 4mg q6h PRN for nausea/vomit  [] meclizine 25mg q6h PRN for dizziness  [] PT evaluation (vestibular therapy)  [] follow-up with established ENT provider  [] follow-up with neurology (Dr Smith [phone]) in 2 weeks    Case discussed w/ stroke fellow Dr. Kevyn Renae under supervision of attending Dr. Maria Victoria Carrera 68y F with Hx vertigo, presenting w/ CC of vertigo    LKW: 4pm 1/16/24  NIHSS: 0  MRS: 0  Not tenecteplase candidate due to peripheral vertigo more likely  Not thrombectomy candidate due to lack of suspicion for LVO    Impression: acute onset of vertigo, nausea, vomiting, tinnitus. Most suspicious for peripheral vertigo. Would treat symptoms accordingly    Recommendations:  [] f/u CTA head/neck w/ contrast  [] Zofran 4mg q6h PRN for nausea/vomit  [] meclizine 25mg q6h PRN for dizziness  [] PT evaluation (vestibular therapy)  [] follow-up with established ENT provider  [] follow-up with neurology (Dr Smith [phone]) in 2 weeks  [] ok from neurological perspective to DC with expedited outpatient work-up as above    Case discussed w/ stroke fellow Dr. Kevyn Renae under supervision of attending Dr. Maria Victoria Carrera

## 2024-01-16 NOTE — ED ADULT NURSE NOTE - OBJECTIVE STATEMENT
68y f pt c/o dizziness and nausea for last hour 68y f pt c/o dizziness and nausea started 45 mins ago while at work; pt states has hx of vertigo but when gets nauseous can not tolerate meclizine; pt states symptoms usually last 3 days; cant come to hospital because so dizzy; this time was here at work and came down; pt denies numbness to 1 side; no facial droop; no slurring; aox3; no resp distress; + mild nystagmus; no chest pain; no abd pain; + nausea; no v/d; denies fever/chills; no numbness/tingling; code stroke called; fs done; pt brought to ct scan; neuro at chairside; iv placed; labs drawn; neuro decide not to treat for stroke

## 2024-01-19 ENCOUNTER — NON-APPOINTMENT (OUTPATIENT)
Age: 69
End: 2024-01-19

## 2024-02-07 ENCOUNTER — OUTPATIENT (OUTPATIENT)
Dept: OUTPATIENT SERVICES | Facility: HOSPITAL | Age: 69
LOS: 1 days | End: 2024-02-07
Payer: COMMERCIAL

## 2024-02-07 ENCOUNTER — APPOINTMENT (OUTPATIENT)
Dept: MRI IMAGING | Facility: CLINIC | Age: 69
End: 2024-02-07
Payer: COMMERCIAL

## 2024-02-07 DIAGNOSIS — I67.1 CEREBRAL ANEURYSM, NONRUPTURED: ICD-10-CM

## 2024-02-07 PROCEDURE — 70546 MR ANGIOGRAPH HEAD W/O&W/DYE: CPT

## 2024-02-07 PROCEDURE — 70546 MR ANGIOGRAPH HEAD W/O&W/DYE: CPT | Mod: 26

## 2024-02-07 PROCEDURE — A9585: CPT

## 2025-04-01 ENCOUNTER — NON-APPOINTMENT (OUTPATIENT)
Age: 70
End: 2025-04-01